# Patient Record
Sex: MALE | Race: WHITE | NOT HISPANIC OR LATINO | Employment: FULL TIME | URBAN - METROPOLITAN AREA
[De-identification: names, ages, dates, MRNs, and addresses within clinical notes are randomized per-mention and may not be internally consistent; named-entity substitution may affect disease eponyms.]

---

## 2018-08-05 ENCOUNTER — OFFICE VISIT (OUTPATIENT)
Dept: URGENT CARE | Facility: CLINIC | Age: 23
End: 2018-08-05
Payer: COMMERCIAL

## 2018-08-05 VITALS
HEIGHT: 68 IN | RESPIRATION RATE: 16 BRPM | WEIGHT: 219 LBS | SYSTOLIC BLOOD PRESSURE: 126 MMHG | TEMPERATURE: 97.3 F | OXYGEN SATURATION: 100 % | BODY MASS INDEX: 33.19 KG/M2 | DIASTOLIC BLOOD PRESSURE: 62 MMHG | HEART RATE: 72 BPM

## 2018-08-05 DIAGNOSIS — L23.7 ALLERGIC CONTACT DERMATITIS DUE TO PLANTS, EXCEPT FOOD: Primary | ICD-10-CM

## 2018-08-05 PROCEDURE — 99213 OFFICE O/P EST LOW 20 MIN: CPT | Performed by: FAMILY MEDICINE

## 2018-08-05 RX ORDER — PREDNISONE 20 MG/1
TABLET ORAL
Qty: 14 TABLET | Refills: 0 | Status: SHIPPED | OUTPATIENT
Start: 2018-08-05

## 2018-08-05 NOTE — PROGRESS NOTES
Assessment/Plan:    Problem List Items Addressed This Visit        Musculoskeletal and Integument    Allergic contact dermatitis due to plants, except food - Primary    Relevant Medications    predniSONE 20 mg tablet          Patient Instructions   COOL COMPRESSES  BENADRYL AS NEEDED  MEDICATION AS DIRECTED  RV PRN      Return in about 1 week (around 8/12/2018), or if symptoms worsen or fail to improve  Subjective:      Patient ID: Judith Ochoa is a 26 y o  male  Chief Complaint   Patient presents with    Rash     rash outbreak on legs and arms bilateral; onset 2 weeks       Rash   This is a new problem  The current episode started yesterday  The problem has been rapidly worsening since onset  The affected locations include the torso, left upper leg and right upper leg  The rash is characterized by redness and itchiness  He was exposed to plant contact  Pertinent negatives include no anorexia, congestion, cough, diarrhea, eye pain, facial edema, fatigue, fever, joint pain, nail changes, rhinorrhea, shortness of breath, sore throat or vomiting  Past treatments include nothing  His past medical history is significant for allergies  The following portions of the patient's history were reviewed and updated as appropriate: allergies, current medications, past family history, past medical history, past social history, past surgical history and problem list     Review of Systems   Constitutional: Negative for chills, fatigue and fever  HENT: Negative for congestion, rhinorrhea and sore throat  Eyes: Negative for pain and discharge  Respiratory: Negative for cough, chest tightness and shortness of breath  Cardiovascular: Negative for chest pain and palpitations  Gastrointestinal: Negative for abdominal pain, anorexia, diarrhea, nausea and vomiting  Musculoskeletal: Negative for arthralgias, gait problem and joint pain  Skin: Positive for rash  Negative for nail changes     Neurological: Negative for dizziness, weakness and headaches  Hematological: Negative for adenopathy  Psychiatric/Behavioral: The patient is not nervous/anxious  Current Outpatient Prescriptions   Medication Sig Dispense Refill    predniSONE 20 mg tablet 2 PO QD X 4 DAYS, THEN 1 PO QD X 4 DAYS, THEN 1/2 PO QD X 4 DAYS 14 tablet 0     No current facility-administered medications for this visit  Objective:    /62   Pulse 72   Temp (!) 97 3 °F (36 3 °C)   Resp 16   Ht 5' 8" (1 727 m)   Wt 99 3 kg (219 lb)   SpO2 100%   BMI 33 30 kg/m²        Physical Exam   Constitutional: He is oriented to person, place, and time  He appears well-developed and well-nourished  HENT:   Head: Normocephalic and atraumatic  Eyes: Conjunctivae and EOM are normal  Pupils are equal, round, and reactive to light  Right eye exhibits no discharge  Left eye exhibits no discharge  Neck: Neck supple  No JVD present  No thyromegaly present  Cardiovascular: Normal rate, regular rhythm and normal heart sounds  No murmur heard  Pulmonary/Chest: Effort normal and breath sounds normal  He has no wheezes  He has no rales  Abdominal: Soft  Bowel sounds are normal  He exhibits no mass  There is no hepatosplenomegaly  There is no tenderness  There is no rebound, no guarding and no CVA tenderness  Musculoskeletal: Normal range of motion  He exhibits no edema, tenderness or deformity  Lymphadenopathy:     He has no cervical adenopathy  He has no axillary adenopathy  Neurological: He is alert and oriented to person, place, and time  Skin: Skin is warm and dry  Rash noted  No erythema  RASH ON TORSO AND BOTH LOWER LEGS  ERYTHEMATOUS  PRURITIC  CONSISTENT WITH A CONTACT DERM   Psychiatric: He has a normal mood and affect   His behavior is normal  Judgment and thought content normal               Nani Berger MD

## 2019-06-11 ENCOUNTER — OFFICE VISIT (OUTPATIENT)
Dept: URGENT CARE | Facility: CLINIC | Age: 24
End: 2019-06-11
Payer: COMMERCIAL

## 2019-06-11 VITALS
HEART RATE: 81 BPM | WEIGHT: 206 LBS | DIASTOLIC BLOOD PRESSURE: 58 MMHG | TEMPERATURE: 99.9 F | BODY MASS INDEX: 31.32 KG/M2 | SYSTOLIC BLOOD PRESSURE: 110 MMHG | RESPIRATION RATE: 16 BRPM | OXYGEN SATURATION: 100 %

## 2019-06-11 DIAGNOSIS — L23.7 POISON IVY DERMATITIS: Primary | ICD-10-CM

## 2019-06-11 PROCEDURE — 99213 OFFICE O/P EST LOW 20 MIN: CPT | Performed by: PHYSICIAN ASSISTANT

## 2019-06-11 RX ORDER — METHYLPREDNISOLONE 4 MG/1
TABLET ORAL
Qty: 1 EACH | Refills: 0 | Status: SHIPPED | OUTPATIENT
Start: 2019-06-11

## 2022-04-26 ENCOUNTER — OCCUPATIONAL MEDICINE (OUTPATIENT)
Dept: URGENT CARE | Facility: CLINIC | Age: 27
End: 2022-04-26
Payer: COMMERCIAL

## 2022-04-26 ENCOUNTER — APPOINTMENT (OUTPATIENT)
Dept: RADIOLOGY | Facility: IMAGING CENTER | Age: 27
End: 2022-04-26
Attending: FAMILY MEDICINE
Payer: COMMERCIAL

## 2022-04-26 VITALS
RESPIRATION RATE: 16 BRPM | OXYGEN SATURATION: 99 % | HEIGHT: 69 IN | TEMPERATURE: 99.1 F | DIASTOLIC BLOOD PRESSURE: 72 MMHG | HEART RATE: 74 BPM | WEIGHT: 193 LBS | SYSTOLIC BLOOD PRESSURE: 122 MMHG | BODY MASS INDEX: 28.58 KG/M2

## 2022-04-26 DIAGNOSIS — S86.911A KNEE STRAIN, RIGHT, INITIAL ENCOUNTER: ICD-10-CM

## 2022-04-26 DIAGNOSIS — S89.91XA INJURY OF RIGHT KNEE, INITIAL ENCOUNTER: ICD-10-CM

## 2022-04-26 DIAGNOSIS — Z87.828 HISTORY OF MENISCAL TEAR: ICD-10-CM

## 2022-04-26 PROCEDURE — 99203 OFFICE O/P NEW LOW 30 MIN: CPT | Performed by: FAMILY MEDICINE

## 2022-04-26 PROCEDURE — 73564 X-RAY EXAM KNEE 4 OR MORE: CPT | Mod: TC,RT | Performed by: FAMILY MEDICINE

## 2022-04-26 NOTE — LETTER
[address] [place] [address]  Phone:  [phone] - Fax:  [phone]   [facility] Progress Report and Disability Certification  Date of Service: 2022   No Show:  No  Date / Time of Next Visit: 5/3/2022 occupational medication   Claim Information   Patient Name: Nick Brewer  Claim Number:     Employer: [employer]  Date of Injury: 2022     Insurer / TPA: [employer] Workers [employer]  ID / SSN:     Occupation: Maintenance  Diagnosis: Diagnoses of Injury of right knee, initial encounter, Knee strain, right, initial encounter, and History of meniscal tear were pertinent to this visit.    Medical Information   Related to Industrial Injury? Yes    Subjective Complaints:  DOI 2010  Right knee injury.  DELMER: Slipped slipped and fell on wet grass.  He is unsure if he struck his knee directly to the ground, twisted, or both.  Initially had sensation of giving way and has also had sensation of locking.  Past medical history meniscus tear.  Knee feels tight.  No abrasion or laceration.  No other aggravating or alleviating factors.   Objective Findings: Right knee: tender medial joint line, no deformity, full range of motion. Stable. No obvious effusion. Distal neuro/vascular intact.      Pre-Existing Condition(s):     Assessment:   Initial Visit    Status: Additional Care Required  Permanent Disability:No    Plan:   Comments:knee brace, ice, otc nsaid, rest    Diagnostics: X-ray  Comments:negative    Comments:       Disability Information   Status: Released to Restricted Duty    From:  2022  Through: 5/3/2022 Restrictions are: Temporary   Physical Restrictions   Sitting:    Standing:  < or = to 2 hrs/day Stooping:    Bending:      Squattin hrs/day Walking:  < or = to 1 hr/day Climbing:    Pushing:      Pulling:    Other:    Reaching Above Shoulder (L):   Reaching Above Shoulder (R):       Reaching Below Shoulder (L):     Reaching Below Shoulder (R):      Not to exceed Weight Limits   Carrying(hrs):   Weight Limit(lb): < or = to 10 pounds Lifting(hrs):   Weight  Limit(lb): < or = to 10 pounds   Comments:      Repetitive Actions   Hands: i.e. Fine Manipulations from Grasping:     Feet: i.e. Operating Foot Controls:     Driving / Operate Machinery:     Health Care Provider’s Original or Electronic Signature  Luis Aguirre M.D. Health Care Provider’s Original or Electronic Signature    Khurram Posada MD         Clinic Name / Location: [address] [address] Clinic Phone Number: Dept: [phone]   Appointment Time: 4:00 Pm Visit Start Time: 5:12 PM   Check-In Time:  4:35 Pm Visit Discharge Time:  6:13 PM   Original-Treating Physician or Chiropractor    Page 2-Insurer/TPA    Page 3-Employer    Page 4-Employee

## 2022-04-26 NOTE — LETTER
"EMPLOYEE’S CLAIM FOR COMPENSATION/ REPORT OF INITIAL TREATMENT  FORM C-4    EMPLOYEE’S CLAIM - PROVIDE ALL INFORMATION REQUESTED   First Name  Nick Last Name  Daniella Birthdate                    1995                Sex  male Claim Number (Insurer’s Use Only)    Home Address  [address] [place] [address] Age  26 y.o. 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  Zip  [address]    Date of Injury  4/26/2022               Hours Injury  10:30 AM Date Employer Notified  4/26/2022 Last Day of Work after Injury     or Occupational Disease  4/26/2022 Supervisor to Whom Injury     Reported  [place]   Address or Location of Accident (if applicable)  [place]   What were you doing at the time of accident? (if applicable)  Moving the green/walking around the green    How did this injury or occupational disease occur? (Be specific an answer in detail. Use additional sheet if necessary)  Sipped of freshly watered grass or clipping   If you believe that you have an occupational disease, when did you first have knowledge of the disability and it relationship to your employment?  N/A Witnesses to the Accident  None      Nature of Injury or Occupational Disease  Contusion  Part(s) of Body Injured or Affected  Knee (R), ,     I certify that the above is true and correct to the best of my knowledge and that I have provided this information in order to obtain the " benefits of [place]  I hereby authorize any physician, chiropractor, surgeon, practitioner, or other person, any hospital, including [facility], any medical service organization, any insurance company, or other institution or organization to release to each other, any medical or other information, including benefits paid or payable, pertinent to this injury or disease, except information relative to diagnosis, treatment and/or counseling for AIDS, psychological conditions, alcohol or controlled substances, for which I must give specific authorization.  A Photostat of this authorization shall be as valid as the original.     Date   Place Employee’s Original or  *Electronic Signature   THIS REPORT MUST BE COMPLETED AND MAILED WITHIN 3 WORKING DAYS OF TREATMENT   Place  [facility]   Date  4/26/2022 Diagnosis and Description of Injury or Occupational Disease  (S89.91XA) Injury of right knee, initial encounter  (S86.911A) Knee strain, right, initial encounter  (Z87.828) History of meniscal tear Is there evidence the injured employee was under the influence of alcohol and/or another controlled substance at the time of accident?  ? No ? Yes (if yes, please explain)    Hour  5:12 PM   Diagnoses of Injury of right knee, initial encounter, Knee strain, right, initial encounter, and History of meniscal tear were pertinent to this visit. No   Treatment  knee brace, ice, otc nsaid, rest  Have you advised the patient to remain off work five days or     more?    X-Ray Findings  Negative   ? Yes Indicate dates:   From   To      From information given by the employee, together with medical evidence, can        you directly connect this injury or occupational disease as job incurred?  Yes ? No If no, is the injured employee capable of:  ? full duty  No ?  "modified duty  Yes   Is additional medical care by a physician indicated?  Yes If Modified Duty, Specify any Limitations / Restrictions  No squatting  Standing < 2 hours  Walking < 1 hour  Lifting, carrying <10# weight limit   Do you know of any previous injury or disease contributing to this condition or occupational disease?  ? Yes ? No (Explain if yes)                          No   Date  4/26/2022 Print Health Care Provider's   Luis Aguirre M.D. I certify the employer’s copy of  this form was mailed on:   Address  [address] [place] [address] Insurer’s Use Only     [facility] Zip  [address]    Provider’s Tax ID Number  [id number] Telephone  Dept: [phone]             Health Care Provider’s Original or Electronic Signature  e-LUIS Raymond M.D. Degree (MD,DO, DC,PA-C,APRN)   MD      * Complete and attach Release of Information (Form C-4A) when injured employee signs C-4 Form electronically  ORIGINAL - TREATING HEALTHCARE PROVIDER PAGE 2 - INSURER/TPA PAGE 3 - EMPLOYER PAGE 4 - EMPLOYEE             Form C-4 (rev.08/21)           BRIEF DESCRIPTION OF RIGHTS AND BENEFITS  (Pursuant to [place] 616C.050)    Notice of Injury or Occupational Disease (Incident Report Form C-1): If an injury or occupational disease (OD) arises out of and in the course of employment, you must provide written notice to your employer as soon as practicable, but no later than 7 days after the accident or OD. Your employer shall maintain a sufficient supply of the required forms.    Claim for Compensation (Form C-4): If medical treatment is sought, the form C-4 is available at the place of initial treatment. A completed \"Claim for Compensation\" (Form C-4) must be filed within 90 days after an accident or OD. The treating physician or chiropractor must, within 3 working days after treatment, complete and mail to the employer, the employer's insurer and third-party , the Claim for Compensation.    Medical Treatment: If you " require medical treatment for your on-the-job injury or OD, you may be required to select a physician or chiropractor from a list provided by your workers’ compensation insurer, if it has contracted with an Organization for Managed Care (MCO) or Preferred Provider Organization (PPO) or providers of health care. If your employer has not entered into a contract with an MCO or PPO, you may select a physician or chiropractor from the Panel of Physicians and Chiropractors. Any medical costs related to your industrial injury or OD will be paid by your insurer.    Temporary Total Disability (TTD): If your doctor has certified that you are unable to work for a period of at least 5 consecutive days, or 5 cumulative days in a 20-day period, or places restrictions on you that your employer does not accommodate, you may be entitled to TTD compensation.    Temporary Partial Disability (TPD): If the wage you receive upon reemployment is less than the compensation for TTD to which you are entitled, the insurer may be required to pay you TPD compensation to make up the difference. TPD can only be paid for a maximum of 24 months.    Permanent Partial Disability (PPD): When your medical condition is stable and there is an indication of a PPD as a result of your injury or OD, within 30 days, your insurer must arrange for an evaluation by a rating physician or chiropractor to determine the degree of your PPD. The amount of your PPD award depends on the date of injury, the results of the PPD evaluation, your age and wage.    Permanent Total Disability (PTD): If you are medically certified by a treating physician or chiropractor as permanently and totally disabled and have been granted a PTD status by your insurer, you are entitled to receive monthly benefits not to exceed 66 2/3% of your average monthly wage. The amount of your PTD payments is subject to reduction if you previously received a lump-sum PPD award.    Vocational  Rehabilitation Services: You may be eligible for vocational rehabilitation services if you are unable to return to the job due to a permanent physical impairment or permanent restrictions as a result of your injury or occupational disease.    Transportation and Per Kenji Reimbursement: You may be eligible for travel expenses and per kenji associated with medical treatment.    Reopening: You may be able to reopen your claim if your condition worsens after claim closure.     Appeal Process: If you disagree with a written determination issued by the insurer or the insurer does not respond to your request, you may appeal to the Department of Administration, , by following the instructions contained in your determination letter. You must appeal the determination within 70 days from the date of the determination letter at [address], [address], [place], [place] [address], or [address], [address], [place], [place] [address]. If you disagree with the  decision, you may appeal to the Department of Administration, . You must file your appeal within 30 days from the date of the  decision letter at [address], [address], [place], [place] [address], or [address], [address], [place], [place] [address]. If you disagree with a decision of an , you may file a petition for judicial review with the District Court. You must do so within 30 days of the Appeal Officer’s decision. You may be represented by an  at your own expense or you may contact the [facility] for possible representation.    [place]  for Injured Workers (NAIW): If you disagree with a  decision, you may request that NAIW represent you without charge at an  Hearing. For information regarding denial of benefits, you may contact the [facility] at: [address], [address], [place] [address], [phone], or [address], [address], [place] [address], [phone]    To File a Complaint with the Division: If you wish to file a complaint with the  of the Division of Industrial Relations (DIR),  please contact the Workers’ Compensation Section, [address], [address], [place], [place] [address], telephone ([phone]) [phone], or [address], [address], [place], [place] [address], telephone [phone].    For assistance with Workers’ Compensation Issues: You may contact the [facility] Office for Consumer Health Assistance, [address], [address], [place], [place] [address], Toll Free 1-615.817.6152, Web site: http://[facility].[place].gov/Programs/CRISTAL E-mail: cristal@[email or web address].[email or web address].[email or web address]              __________________________________________________________________                                    _________________            Employee Name / Signature                                                                                                                            Date                                                                                                                                                                                                                              D-2 (rev. 10/20)

## 2022-05-03 ENCOUNTER — OCCUPATIONAL MEDICINE (OUTPATIENT)
Dept: OCCUPATIONAL MEDICINE | Facility: CLINIC | Age: 27
End: 2022-05-03
Payer: COMMERCIAL

## 2022-05-03 VITALS
DIASTOLIC BLOOD PRESSURE: 72 MMHG | SYSTOLIC BLOOD PRESSURE: 120 MMHG | WEIGHT: 200 LBS | HEIGHT: 69 IN | RESPIRATION RATE: 18 BRPM | BODY MASS INDEX: 29.62 KG/M2

## 2022-05-03 DIAGNOSIS — S86.911D KNEE STRAIN, RIGHT, SUBSEQUENT ENCOUNTER: ICD-10-CM

## 2022-05-03 PROCEDURE — 99203 OFFICE O/P NEW LOW 30 MIN: CPT | Performed by: PREVENTIVE MEDICINE

## 2022-05-03 NOTE — LETTER
[address],   [address] [place] [address]  Phone:  [phone] - Fax:  [phone]   Occupational Health [facility] Progress Report and Disability Certification  Date of Service: 5/3/2022   No Show:  No  Date / Time of Next Visit: 5/24/2022 @ 9:45am   Claim Information   Patient Name: Nick Brewer  Claim Number:     Employer: [employer]  Date of Injury: 4/26/2022     Insurer / TPA: [employer]  ID / SSN:     Occupation: Maintenance  Diagnosis: The encounter diagnosis was Knee strain, right, subsequent encounter.    Medical Information   Related to Industrial Injury? 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  DELMER: Slipped slipped and fell on wet grass.  He is unsure if he struck his knee directly to the ground, twisted, or both.  He seen urgent care x1, x-rays of the right knee were negative for acute findings.  Patient states overall symptoms are the same.  He continues to have pain over the anterior medial knee.  He states he has a lot of clicking, popping and instability in the knee.  Finds it difficult to walk for more than 15 minutes due to the symptoms.  He wears a knee brace to help.  Patient does have a remote history of meniscal injury status post arthroscopy 8 years ago.  Currently not working due to work restrictions.   Objective Findings: Right knee: No gross deformity.  Tenderness over the patella tendon, medial joint line and posterior knee.  Possible mild laxity with anterior drawer test.  No laxity with posterior drawer test.  No laxity varus or valgus stress but does elicit pain.  Elpidio's positive.  Able to squat only about one quarter of the way down.   Pre-Existing Condition(s):     Assessment:   Condition Same    Status: Additional Care Required  Permanent Disability:No    Plan:      Diagnostics:      Comments:  Given symptoms and exam findings  will refer for MRI right knee  OTC ibuprofen/Tylenol as needed  Gradually increase walking as tolerated  Restricted duty  Follow-up 2-3 weeks sooner if MRI performed sooner    Disability Information   Status: Released to Restricted Duty    From:  5/3/2022  Through: 2022 Restrictions are: Temporary   Physical Restrictions   Sitting:    Standing:  < or = to 2 hrs/day Stooping:    Bending:      Squattin hrs/day Walking:  < or = to 1 hr/day Climbin hrs/day Pushing:      Pulling:    Other:    Reaching Above Shoulder (L):   Reaching Above Shoulder (R):       Reaching Below Shoulder (L):    Reaching Below Shoulder (R):      Not to exceed Weight Limits   Carrying(hrs):   Weight Limit(lb): < or = to 10 pounds Lifting(hrs):   Weight  Limit(lb): < or = to 10 pounds   Comments:      Repetitive Actions   Hands: i.e. Fine Manipulations from Grasping:     Feet: i.e. Operating Foot Controls:     Driving / Operate Machinery:     Health Care Provider’s Original or Electronic Signature  Tonio Rendon D.O. Health Care Provider’s Original or Electronic Signature    Khurram Posada MD         Clinic Name / Location: [address],   [address] [place] [address] Clinic Phone Number: Dept: [phone]   Appointment Time: 10:00 Am Visit Start Time: 10:10 AM   Check-In Time:  10:04 Am Visit Discharge Time:  10:54am   Original-Treating Physician or Chiropractor    Page 2-Insurer/TPA    Page 3-Employer    Page 4-Employee

## 2022-05-03 NOTE — PROGRESS NOTES
"Subjective     Nick Brewer is a 26 y.o. male who presents with Knee Pain ((R) fell on wet grass at work, leg gave out when trying to stand again, having some soreness/)      DOI 4/26/2010  Right knee injury.  DELMER: Slipped slipped and fell on wet grass.  He is unsure if he struck his knee directly to the ground, twisted, or both.  Initially had sensation of giving way and has also had sensation of locking.  Past medical history meniscus tear.  Knee feels tight.  No abrasion or laceration.  No other aggravating or alleviating factors.     HPI    ROS           Objective     /72   Pulse 74   Temp 37.3 °C (99.1 °F) (Temporal)   Resp 16   Ht 1.753 m (5' 9\")   Wt 87.5 kg (193 lb)   SpO2 99%   BMI 28.50 kg/m²      Physical Exam    Right knee: tender medial joint line, no deformity, full range of motion. Stable. No obvious effusion. Distal neuro/vascular intact.                      Assessment & Plan   Xray: no fracture or dislocation per radiology       1. Injury of right knee, initial encounter    - DX-KNEE COMPLETE 4+ RIGHT; Future  - Referral to Occupational Medicine    2. Knee strain, right, initial encounter    - Referral to Occupational Medicine    3. History of meniscal tear    - Referral to Occupational Medicine       Differential diagnosis, natural history, supportive care, and indications for immediate follow-up discussed at length.     Light duty on D 39.  Given his history of meniscus injury and surgery will transfer care to occupational medicine for follow-up.   Ice and NSAID as needed.        "

## 2022-05-03 NOTE — PROGRESS NOTES
"Subjective:     Nick Brewer is a 26 y.o. 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  DELMER: Slipped slipped and fell on wet grass.  He is unsure if he struck his knee directly to the ground, twisted, or both.  He seen urgent care x1, x-rays of the right knee were negative for acute findings.  Patient states overall symptoms are the same.  He continues to have pain over the anterior medial knee.  He states he has a lot of clicking, popping and instability in the knee.  Finds it difficult to walk for more than 15 minutes due to the symptoms.  He wears a knee brace to help.  Patient does have a remote history of meniscal injury status post arthroscopy 8 years ago.  Currently not working due to work restrictions.    ROS: All systems were reviewed on intake form, form was reviewed and signed. See scanned documents in media. Pertinent positives and negatives included in HPI.    PMH: No pertinent past medical history to this problem  MEDS: Medications were reviewed in Epic  ALLERGIES:   Allergies   Allergen Reactions   • Penicillins Hives     SOCHX: Works as a  at [employer]  FH: No pertinent family history to this problem       Objective:     /72   Resp 18   Ht 1.753 m (5' 9\")   Wt 90.7 kg (200 lb)   BMI 29.53 kg/m²     Constitutional: Patient is in no acute distress. Appears well-developed and well-nourished.   HENT: Normocephalic and atraumatic. EOM are normal. No scleral icterus.   Cardiovascular: Normal rate.    Pulmonary/Chest: Effort normal. No respiratory distress.   Neurological: Patient is alert and oriented to person, place, and time.   Skin: Skin is warm and dry.   Psychiatric: Normal mood and affect. Behavior is normal.     Right knee: No gross deformity.  Tenderness over the patella tendon, medial joint line and posterior knee.  Possible mild laxity " with anterior drawer test.  No laxity with posterior drawer test.  No laxity varus or valgus stress but does elicit pain.  Elpidio's positive.  Able to squat only about one quarter of the way down.    Assessment/Plan:       1. Knee strain, right, subsequent encounter  - Referral to Radiology  - MR-KNEE-W/O RIGHT; Future    Released to Restricted Duty FROM 5/3/2022 TO 5/24/2022     Given symptoms and exam findings will refer for MRI right knee  OTC ibuprofen/Tylenol as needed  Gradually increase walking as tolerated  Restricted duty  Follow-up 2-3 weeks sooner if MRI performed sooner    Differential diagnosis, natural history, supportive care, and indications for immediate follow-up discussed.    Approximately 35 minutes were spent in reviewing notes, preparing for visit, obtaining history, exam and evaluation, patient counseling/education and post visit documentation/orders.

## 2022-05-24 ENCOUNTER — OCCUPATIONAL MEDICINE (OUTPATIENT)
Dept: OCCUPATIONAL MEDICINE | Facility: CLINIC | Age: 27
End: 2022-05-24
Payer: COMMERCIAL

## 2022-05-24 VITALS
BODY MASS INDEX: 29.62 KG/M2 | DIASTOLIC BLOOD PRESSURE: 78 MMHG | HEART RATE: 76 BPM | HEIGHT: 69 IN | SYSTOLIC BLOOD PRESSURE: 124 MMHG | RESPIRATION RATE: 16 BRPM | WEIGHT: 200 LBS

## 2022-05-24 DIAGNOSIS — S86.911D KNEE STRAIN, RIGHT, SUBSEQUENT ENCOUNTER: ICD-10-CM

## 2022-05-24 PROCEDURE — 99213 OFFICE O/P EST LOW 20 MIN: CPT | Performed by: PREVENTIVE MEDICINE

## 2022-05-24 NOTE — PROGRESS NOTES
"Subjective:     Nick Brewer is a 26 y.o. 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  DELMER: Slipped slipped and fell on wet grass.  He is unsure if he struck his knee directly to the ground, twisted, or both.  Patient states overall symptoms about the same.  Continues to get pain on the medial side as well as in the posterior knee.  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  He is also pushing and pulling heavy carts of laundry.  He states these things did seem to worsen his symptoms.  Patient has MRI scheduled for June 14.    ROS         Objective:     /78   Pulse 76   Resp 16   Ht 1.753 m (5' 9\")   Wt 90.7 kg (200 lb)   BMI 29.53 kg/m²     Constitutional: Patient is in no acute distress. Appears well-developed and well-nourished.   Cardiovascular: Normal rate.    Pulmonary/Chest: Effort normal. No respiratory distress.   Neurological: Patient is alert and oriented to person, place, and time.   Skin: Skin is warm and dry.   Psychiatric: Normal mood and affect. Behavior is normal.     Right knee: No gross deformity.  Tenderness over the patella tendon, medial joint line and posterior knee.  Possible mild laxity with anterior drawer test.  Elpidio's positive.      Assessment/Plan:       1. Knee strain, right, subsequent encounter  - Referral to Physical Therapy    Released to Restricted Duty FROM 5/24/2022 TO 6/17/2022  If unable to accommodate these restrictions patient to be taken off work    Keep appointment for MRI right knee  Referral to physical therapy  OTC ibuprofen/Tylenol as needed  Gradually increase walking as tolerated  Restricted duty  Follow-up 2-3 weeks sooner if MRI performed sooner    Differential diagnosis, natural history, supportive care, and indications for immediate follow-up " discussed.    Approximately 25 minutes was spent in preparing for visit, obtaining history, exam and evaluation, patient counseling/education and post visit documentation/orders.

## 2022-05-24 NOTE — LETTER
[address],   [address] [place] [address]  Phone:  [phone] - Fax:  [phone]   [facility] Progress Report and Disability Certification  Date of Service: 5/24/2022   No Show:  No  Date / Time of Next Visit: 6/17/2022 @ +9:45am   Claim Information   Patient Name: Nick Brewer  Claim Number:     Employer: [employer]  Date of Injury: 4/26/2022     Insurer / TPA: [employer]  ID / SSN:     Occupation: Maintenance  Diagnosis: The encounter diagnosis was Knee strain, right, subsequent encounter.    Medical Information   Related to Industrial Injury? 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  DELMER: Slipped slipped and fell on wet grass.  He is unsure if he struck his knee directly to the ground, twisted, or both.  Patient states overall symptoms about the same.  Continues to get pain on the medial side as well as in the posterior knee.  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  He is also pushing and pulling heavy carts of laundry.  He states these things did seem to worsen his symptoms.  Patient has MRI scheduled for June 14.   Objective Findings: Right knee: No gross deformity.  Tenderness over the patella tendon, medial joint line and posterior knee.  Possible mild laxity with anterior drawer test.  Elpidio's positive.     Pre-Existing Condition(s):     Assessment:   Condition Same    Status: Additional Care Required  Permanent Disability:No    Plan:      Diagnostics:      Comments:  Keep appointment for MRI right knee  Referral to physical therapy  OTC ibuprofen/Tylenol as needed  Gradually increase walking as tolerated  Restricted duty  Follow-up 2-3 weeks sooner if MRI performed sooner    Disability Information   Status: Released to Restricted Duty     From:  2022  Through: 2022 Restrictions are: Temporary   Physical Restrictions   Sitting:    Standing:  < or = to 2 hrs/day Stooping:    Bending:      Squattin hrs/day Walking:  < or = to 1 hr/day Climbin hrs/day Pushing:  < or = to 1 hr/day   Pulling:  < or = to 1 hr/day Other:    Reaching Above Shoulder (L):   Reaching Above Shoulder (R):       Reaching Below Shoulder (L):    Reaching Below Shoulder (R):      Not to exceed Weight Limits   Carrying(hrs):   Weight Limit(lb): < or = to 10 pounds Lifting(hrs):   Weight  Limit(lb): < or = to 10 pounds   Comments: If unable to accommodate these restrictions patient to be taken off work    Repetitive Actions   Hands: i.e. Fine Manipulations from Grasping:     Feet: i.e. Operating Foot Controls:     Driving / Operate Machinery:     Health Care Provider’s Original or Electronic Signature  Tonio Rendon D.O. Health Care Provider’s Original or Electronic Signature    Khurram Posada MD         Clinic Name / Location: [address],   [address] [address] Clinic Phone Number: Dept: [phone]   Appointment Time: 9:45 Am Visit Start Time: 9:44 AM   Check-In Time:  9:36 Am Visit Discharge Time:  10:26am   Original-Treating Physician or Chiropractor    Page 2-Insurer/TPA    Page 3-Employer    Page 4-Employee

## 2022-06-10 ENCOUNTER — OCCUPATIONAL MEDICINE (OUTPATIENT)
Dept: OCCUPATIONAL MEDICINE | Facility: CLINIC | Age: 27
End: 2022-06-10
Payer: COMMERCIAL

## 2022-06-10 VITALS
TEMPERATURE: 36.5 F | SYSTOLIC BLOOD PRESSURE: 120 MMHG | HEIGHT: 69 IN | OXYGEN SATURATION: 97 % | WEIGHT: 195 LBS | HEART RATE: 88 BPM | DIASTOLIC BLOOD PRESSURE: 82 MMHG | BODY MASS INDEX: 28.88 KG/M2

## 2022-06-10 DIAGNOSIS — S86.911D KNEE STRAIN, RIGHT, SUBSEQUENT ENCOUNTER: ICD-10-CM

## 2022-06-10 PROCEDURE — 99213 OFFICE O/P EST LOW 20 MIN: CPT | Performed by: PREVENTIVE MEDICINE

## 2022-06-10 NOTE — LETTER
[address],   [address] [place] [address]  Phone:  [phone] - Fax:  [phone]   Occupational Health [facility] Progress Report and Disability Certification  Date of Service: 6/10/2022   No Show:  No  Date / Time of Next Visit: 2022 @ 4pm   Claim Information   Patient Name: Nick Brewer  Claim Number:     Employer: [employer]  Date of Injury: 2022     Insurer / TPA: [employer]  ID / SSN:     Occupation: Maintenance  Diagnosis: The encounter diagnosis was Knee strain, right, subsequent encounter.    Medical Information   Related to Industrial Injury? 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  DELMER: Slipped slipped and fell on wet grass.  Patient states overall symptoms have been gradually worsening.  He states with physical therapy has had some worsening of symptoms.  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  He has MRI scheduled the next few days.   Objective Findings: Right knee: No gross deformity.  Area of pain over the anterior, medial and posterior knee.  Antalgic gait.   Pre-Existing Condition(s):     Assessment:   Condition Same    Status: Additional Care Required  Permanent Disability:No    Plan:      Diagnostics:      Comments:  Keep appointment for MRI right knee  Continue physical therapy  OTC ibuprofen/Tylenol as needed  Gradually increase walking as tolerated  Restricted duty  Follow-up next week for MRI results    Disability Information   Status: Released to Restricted Duty    From:  6/10/2022  Through: 2022 Restrictions are: Temporary   Physical Restrictions   Sitting:    Standing:  < or = to 2 hrs/day Stooping:    Bending:      Squattin hrs/day Walking:  < or = to 1 hr/day Climbin hrs/day Pushing:      Pulling:     Other:    Reaching Above Shoulder (L):   Reaching Above Shoulder (R):       Reaching Below Shoulder (L):    Reaching Below Shoulder (R):      Not to exceed Weight Limits   Carrying(hrs):   Weight Limit(lb): < or = to 10 pounds Lifting(hrs):   Weight  Limit(lb): < or = to 10 pounds   Comments:  If unable to accommodate these restrictions patient to be taken off work.  Restrict driving to 30-minutes.    Repetitive Actions   Hands: i.e. Fine Manipulations from Grasping:     Feet: i.e. Operating Foot Controls:     Driving / Operate Machinery:     Health Care Provider’s Original or Electronic Signature  Tonio Rendon D.O. Health Care Provider’s Original or Electronic Signature    Khurram Posada MD         Clinic Name / Location: [address] [place] [address] Clinic Phone Number: Dept: [phone]   Appointment Time: 10:45 Am Visit Start Time: 11:01 AM   Check-In Time:  10:47 Am Visit Discharge Time:  11:25am   Original-Treating Physician or Chiropractor    Page 2-Insurer/TPA    Page 3-Employer    Page 4-Employee

## 2022-06-10 NOTE — PROGRESS NOTES
"Subjective:     Nick Brewer is a 26 y.o. 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  DELMER: Slipped slipped and fell on wet grass.  Patient states overall symptoms have been gradually worsening.  He states with physical therapy has had some worsening of symptoms.  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  He has MRI scheduled the next few days.    ROS         Objective:     /82   Pulse 88   Temp (!) 2.5 °C (36.5 °F)   Ht 1.753 m (5' 9\")   Wt 88.5 kg (195 lb)   SpO2 97%   BMI 28.80 kg/m²     Constitutional: Patient is in no acute distress. Appears well-developed and well-nourished.   Cardiovascular: Normal rate.    Pulmonary/Chest: Effort normal. No respiratory distress.   Neurological: Patient is alert and oriented to person, place, and time.   Skin: Skin is warm and dry.   Psychiatric: Normal mood and affect. Behavior is normal.     Right knee: No gross deformity.  Area of pain over the anterior, medial and posterior knee.  Antalgic gait.    Assessment/Plan:       1. Knee strain, right, subsequent encounter    Released to Restricted Duty FROM 6/10/2022 TO 6/17/2022   If unable to accommodate these restrictions patient to be taken off work.  Restrict driving to 30-minutes.    Keep appointment for MRI right knee  Continue physical therapy  OTC ibuprofen/Tylenol as needed  Gradually increase walking as tolerated  Restricted duty  Follow-up next week for MRI results    Differential diagnosis, natural history, supportive care, and indications for immediate follow-up discussed.    Approximately 25 minutes was spent in preparing for visit, obtaining history, exam and evaluation, patient counseling/education and post visit documentation/orders.  "

## 2022-06-11 ENCOUNTER — APPOINTMENT (OUTPATIENT)
Dept: RADIOLOGY | Facility: MEDICAL CENTER | Age: 27
End: 2022-06-11
Attending: PREVENTIVE MEDICINE
Payer: COMMERCIAL

## 2022-06-17 ENCOUNTER — OCCUPATIONAL MEDICINE (OUTPATIENT)
Dept: OCCUPATIONAL MEDICINE | Facility: CLINIC | Age: 27
End: 2022-06-17
Payer: COMMERCIAL

## 2022-06-17 VITALS
DIASTOLIC BLOOD PRESSURE: 64 MMHG | WEIGHT: 198.4 LBS | HEIGHT: 69 IN | BODY MASS INDEX: 29.38 KG/M2 | TEMPERATURE: 99.4 F | SYSTOLIC BLOOD PRESSURE: 114 MMHG | HEART RATE: 62 BPM | RESPIRATION RATE: 18 BRPM | OXYGEN SATURATION: 97 %

## 2022-06-17 DIAGNOSIS — S86.911D KNEE STRAIN, RIGHT, SUBSEQUENT ENCOUNTER: ICD-10-CM

## 2022-06-17 PROCEDURE — 99213 OFFICE O/P EST LOW 20 MIN: CPT | Performed by: PREVENTIVE MEDICINE

## 2022-06-17 NOTE — PROGRESS NOTES
"Subjective:     Nick Brewer is a 27 y.o. male who presents for Injury (DOI: 4/26/2022 Rt. Knee injury; doing a bit better reg pain. 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  DELMER: Slipped slipped and fell on wet grass.  Patient states of being off this past week has had good improvement in symptoms.  Had MRI performed over this past weekend.  Here for results    ROS    SOCHX: Works as a  at [employer]  FH: No pertinent family history to this problem.       Objective:     /64 (BP Location: Right arm, Patient Position: Sitting, BP Cuff Size: Adult)   Pulse 62   Temp 37.4 °C (99.4 °F) (Temporal)   Resp 18   Ht 1.753 m (5' 9\")   Wt 90 kg (198 lb 6.4 oz)   SpO2 97%   BMI 29.30 kg/m²     Constitutional: Patient is in no acute distress. Appears well-developed and well-nourished.   Cardiovascular: Normal rate.    Pulmonary/Chest: Effort normal. No respiratory distress.   Neurological: Patient is alert and oriented to person, place, and time.   Skin: Skin is warm and dry.   Psychiatric: Normal mood and affect. Behavior is normal.     Right knee: No gross deformity.  Area of pain over the anterior, medial and posterior knee.  Antalgic gait.    MRI right knee: \"Patella bethanie otherwise essentially unremarkable exam.\"    Assessment/Plan:       1. Knee strain, right, subsequent encounter  - Referral to Physical Therapy    Released to Restricted Duty FROM 6/17/2022 TO 7/8/2022  Limit driving to 2 hrs per session    Continue physical therapy, placed referral for 6 more visits  OTC/Tylenol as needed  Wean off knee brace as tolerated  Restricted duty   follow-up 3 weeks    Differential diagnosis, natural history, supportive care, and indications for immediate follow-up discussed.    Approximately 25 minutes was spent in preparing for visit, obtaining history, exam and evaluation, patient " counseling/education and post visit documentation/orders.

## 2022-06-17 NOTE — LETTER
"[address],   [address] [address]  Phone:  [phone] - Fax:  [phone]   Occupational Health [facility] Progress Report and Disability Certification  Date of Service: 6/17/2022   No Show:  No  Date / Time of Next Visit: 7/8/2022   Claim Information   Patient Name: Nick Brewer  Claim Number:     Employer: [employer]  Date of Injury: 4/26/2022     Insurer / TPA: [employer]  ID / SSN:     Occupation: Maintenance  Diagnosis: The encounter diagnosis was Knee strain, right, subsequent encounter.    Medical Information   Related to Industrial Injury? 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  DELMER: Slipped slipped and fell on wet grass.  Patient states of being off this past week has had good improvement in symptoms.  Had MRI performed over this past weekend.  Here for results   Objective Findings: Right knee: No gross deformity.  Area of pain over the anterior, medial and posterior knee.  Antalgic gait.    MRI right knee: \"Patella bethanie otherwise essentially unremarkable exam.\"   Pre-Existing Condition(s):     Assessment:   Condition Same    Status: Additional Care Required  Permanent Disability:No    Plan:      Diagnostics:      Comments:  Continue physical therapy, placed referral for 6 more visits  OTC/Tylenol as needed  Wean off knee brace as tolerated  Restricted duty   follow-up 3 weeks    Disability Information   Status: Released to Restricted Duty    From:  6/17/2022  Through: 7/8/2022 Restrictions are: Temporary   Physical Restrictions   Sitting:    Standing:  < or = to 4 hrs/day Stooping:    Bending:      Squatting:  < or = to 1 hr/day Walking:  < or = to 2 hrs/day Climbing:  < or = to 1 hr/day Pushing:      Pulling:    Other:    Reaching Above Shoulder (L):   Reaching Above Shoulder (R):       Reaching Below Shoulder (L):    Reaching Below Shoulder " (R):      Not to exceed Weight Limits   Carrying(hrs):   Weight Limit(lb): < or = to 10 pounds Lifting(hrs):   Weight  Limit(lb): < or = to 10 pounds   Comments: Limit driving to 2 hrs per session    Repetitive Actions   Hands: i.e. Fine Manipulations from Grasping:     Feet: i.e. Operating Foot Controls:     Driving / Operate Machinery:     Health Care Provider’s Original or Electronic Signature  Tonio Rendon D.O. Health Care Provider’s Original or Electronic Signature    Khurram Posada MD         Clinic Name / Location: [address],   [address]  [place] [address] Clinic Phone Number: Dept: [phone]   Appointment Time: 4:00 Pm Visit Start Time: 4:01 PM   Check-In Time:  3:59 Pm Visit Discharge Time:  4:21 PM   Original-Treating Physician or Chiropractor    Page 2-Insurer/TPA    Page 3-Employer    Page 4-Employee

## 2022-07-08 ENCOUNTER — OCCUPATIONAL MEDICINE (OUTPATIENT)
Dept: OCCUPATIONAL MEDICINE | Facility: CLINIC | Age: 27
End: 2022-07-08
Payer: COMMERCIAL

## 2022-07-08 VITALS
OXYGEN SATURATION: 99 % | RESPIRATION RATE: 16 BRPM | TEMPERATURE: 98.7 F | BODY MASS INDEX: 28.91 KG/M2 | WEIGHT: 195.2 LBS | DIASTOLIC BLOOD PRESSURE: 66 MMHG | HEIGHT: 69 IN | SYSTOLIC BLOOD PRESSURE: 124 MMHG | HEART RATE: 67 BPM

## 2022-07-08 DIAGNOSIS — S86.911D KNEE STRAIN, RIGHT, SUBSEQUENT ENCOUNTER: ICD-10-CM

## 2022-07-08 PROCEDURE — 99213 OFFICE O/P EST LOW 20 MIN: CPT | Performed by: PREVENTIVE MEDICINE

## 2022-07-08 NOTE — LETTER
"[address],   [address] [place] [address]  Phone:  [phone] - Fax:  [phone]   Occupational Health [facility] Progress Report and Disability Certification  Date of Service: 7/8/2022   No Show:  No  Date / Time of Next Visit:  PAULINA ORtho   Claim Information   Patient Name: Nick Brewer  Claim Number:     Employer: [employer]  Date of Injury: 4/26/2022     Insurer / TPA: [employer]  ID / SSN:     Occupation: Maintenance  Diagnosis: The encounter diagnosis was Knee strain, right, subsequent encounter.    Medical Information   Related to Industrial Injury? 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  DELMER: Slipped slipped and fell on wet grass.  Patient states that overall symptoms are about the same.  He states the tightness and pain he is having in his hamstring and surrounding musculature has improved with physical therapy but continues to have significant grinding, clicking and popping of the knee associated with discomfort.  Patient states he was discussing with the nurse  was interested in possible referral to orthopedic.   Objective Findings: Right knee: No gross deformity.  Area of pain over the anterior and medial knee.  Able to squat down about [facility].  Antalgic gait.     MRI right knee: \"Patella bethanie otherwise essentially unremarkable exam.\"   Pre-Existing Condition(s):     Assessment:   Condition Same    Status: Discharged / Care Transfer  Permanent Disability:No    Plan:      Diagnostics:      Comments:  Placed referral for orthopedics  Finish any remaining physical therapy visits  OTC ibuprofen as needed  Gradually increase walking and squatting as tolerated  Restricted duty  Follow-up as needed      Disability Information   Status: Released to Restricted Duty    From:  7/8/2022  Through:   Restrictions are: Temporary   Physical Restrictions "   Sitting:    Standing:  < or = to 4 hrs/day Stooping:    Bending:      Squatting:  < or = to 1 hr/day Walking:  < or = to 2 hrs/day Climbing:    Pushing:      Pulling:    Other:    Reaching Above Shoulder (L):   Reaching Above Shoulder (R):       Reaching Below Shoulder (L):    Reaching Below Shoulder (R):      Not to exceed Weight Limits   Carrying(hrs):   Weight Limit(lb):   Lifting(hrs):   Weight  Limit(lb):     Comments: Limit driving to 2 hrs per session. Until cleared by orthopedics    Repetitive Actions   Hands: i.e. Fine Manipulations from Grasping:     Feet: i.e. Operating Foot Controls:     Driving / Operate Machinery: < or = to 2 hrs/day   Health Care Provider’s Original or Electronic Signature  Tonio Rendon D.O. Health Care Provider’s Original or Electronic Signature    Khurram Posada MD         Clinic Name / Location: [address],   [address] [place] [address] Clinic Phone Number: Dept: [phone]   Appointment Time: 8:30 Am Visit Start Time: 8:33 AM   Check-In Time:  8:19 Am Visit Discharge Time:  0853am     Original-Treating Physician or Chiropractor    Page 2-Insurer/TPA    Page 3-Employer    Page 4-Employee

## 2022-07-08 NOTE — PROGRESS NOTES
"Subjective:     Nick Brewer is a 27 y.o. 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  DELMER: Slipped slipped and fell on wet grass.  Patient states that overall symptoms are about the same.  He states the tightness and pain he is having in his hamstring and surrounding musculature has improved with physical therapy but continues to have significant grinding, clicking and popping of the knee associated with discomfort.  Patient states he was discussing with the nurse  was interested in possible referral to orthopedic.    ROS    SOCHX: Works as a  at [employer]  FH: No pertinent family history to this problem.       Objective:     /66 (BP Location: Right arm, Patient Position: Sitting, BP Cuff Size: Adult)   Pulse 67   Temp 37.1 °C (98.7 °F) (Temporal)   Resp 16   Ht 1.753 m (5' 9\")   Wt 88.5 kg (195 lb 3.2 oz)   SpO2 99%   BMI 28.83 kg/m²     Constitutional: Patient is in no acute distress. Appears well-developed and well-nourished.   Cardiovascular: Normal rate.    Pulmonary/Chest: Effort normal. No respiratory distress.   Neurological: Patient is alert and oriented to person, place, and time.   Skin: Skin is warm and dry.   Psychiatric: Normal mood and affect. Behavior is normal.     Right knee: No gross deformity.  Area of pain over the anterior and medial knee.  Able to squat down about snf.  Antalgic gait.     MRI right knee: \"Patella bethanie otherwise essentially unremarkable exam.\"    Assessment/Plan:       1. Knee strain, right, subsequent encounter  - Referral to Orthopedics    Released to Restricted Duty FROM 7/8/2022 TO    Limit driving to 2 hrs per session. Until cleared by orthopedics    Placed referral for orthopedics  Finish any remaining physical therapy visits  OTC ibuprofen as needed  Gradually increase walking and squatting as " tolerated  Restricted duty  Follow-up as needed      Differential diagnosis, natural history, supportive care, and indications for immediate follow-up discussed.    Approximately 25 minutes was spent in preparing for visit, obtaining history, exam and evaluation, patient counseling/education and post visit documentation/orders.

## 2022-08-11 ENCOUNTER — OCCUPATIONAL MEDICINE (OUTPATIENT)
Dept: OCCUPATIONAL MEDICINE | Facility: CLINIC | Age: 27
End: 2022-08-11
Payer: COMMERCIAL

## 2022-08-11 VITALS
RESPIRATION RATE: 18 BRPM | DIASTOLIC BLOOD PRESSURE: 70 MMHG | HEIGHT: 69 IN | BODY MASS INDEX: 28.88 KG/M2 | HEART RATE: 80 BPM | WEIGHT: 195 LBS | SYSTOLIC BLOOD PRESSURE: 116 MMHG

## 2022-08-11 DIAGNOSIS — S86.911D KNEE STRAIN, RIGHT, SUBSEQUENT ENCOUNTER: ICD-10-CM

## 2022-08-11 PROCEDURE — 99213 OFFICE O/P EST LOW 20 MIN: CPT | Performed by: PREVENTIVE MEDICINE

## 2022-08-11 NOTE — LETTER
"[address],   [address] [place] [address]  Phone:  [phone] - Fax:  [phone]   Occupational Health [facility] Progress Report and Disability Certification  Date of Service: 8/11/2022   No Show:  No  Date / Time of Next Visit: 9/8/2022 @430pm   Claim Information   Patient Name: Nick Brewer  Claim Number:     Employer: [employer]  Date of Injury: 4/26/2022     Insurer / TPA: [employer]  ID / SSN:     Occupation: Maintenance  Diagnosis: The encounter diagnosis was Knee strain, right, subsequent encounter.    Medical Information   Related to Industrial Injury? 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  DELMER: Slipped slipped and fell on wet grass.  Patient states that overall symptoms are about the same.  Patient states that overall symptoms are about the same.  However he has been doing a lot of walking around his apartment including going up and down hills and is up to being able to walk for about an hour and a half before becomes too painful.  He did see orthopedics who recommended patellar stabilizing brace, more physical therapy and referral back to occupational health.  He states he ran out of physical therapy visits and has not done any in over 2 weeks.   Objective Findings: Right knee: No gross deformity.  Area of pain over the anterior and medial knee.  Normal gait.     MRI right knee: \"Patella bethanie otherwise essentially unremarkable exam.\"      Pre-Existing Condition(s):     Assessment:   Condition Same    Status: Additional Care Required  Permanent Disability:No    Plan:      Diagnostics:      Comments:  Referral for more physical therapy visits.  Continue knee brace as needed  Continue to walk around neighborhood increasing duration as tolerated  OTC ibuprofen as needed  Restricted duty  Follow-up 4 weeks    Disability Information   Status: Released to " Restricted Duty    From:  8/11/2022  Through: 9/8/2022 Restrictions are: Temporary   Physical Restrictions   Sitting:    Standing:  < or = to 4 hrs/day Stooping:    Bending:      Squatting:  < or = to 2 hrs/day Walking:  < or = to 4 hrs/day Climbing:    Pushing:      Pulling:    Other:    Reaching Above Shoulder (L):   Reaching Above Shoulder (R):       Reaching Below Shoulder (L):    Reaching Below Shoulder (R):      Not to exceed Weight Limits   Carrying(hrs):   Weight Limit(lb):   Lifting(hrs):   Weight  Limit(lb):     Comments:      Repetitive Actions   Hands: i.e. Fine Manipulations from Grasping:     Feet: i.e. Operating Foot Controls:     Driving / Operate Machinery:     Health Care Provider’s Original or Electronic Signature  Tonio Rendon D.O. Health Care Provider’s Original or Electronic Signature    Khurram Posada MD         Clinic Name / Location: [address] [address] Clinic Phone Number: Dept: [phone]   Appointment Time: 4:45 Pm Visit Start Time: 4:37 PM   Check-In Time:  4:35 Pm Visit Discharge Time:  455pm   Original-Treating Physician or Chiropractor    Page 2-Insurer/TPA    Page 3-Employer    Page 4-Employee

## 2022-08-11 NOTE — PROGRESS NOTES
"Subjective:     Nick Brewer is a 27 y.o. 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  DELMER: Slipped slipped and fell on wet grass.  Patient states that overall symptoms are about the same.  Patient states that overall symptoms are about the same.  However he has been doing a lot of walking around his apartment including going up and down hills and is up to being able to walk for about an hour and a half before becomes too painful.  He did see orthopedics who recommended patellar stabilizing brace, more physical therapy and referral back to occupational health.  He states he ran out of physical therapy visits and has not done any in over 2 weeks.    ROS         Objective:     /70   Pulse 80   Resp 18   Ht 1.753 m (5' 9\")   Wt 88.5 kg (195 lb)   BMI 28.80 kg/m²     Constitutional: Patient is in no acute distress. Appears well-developed and well-nourished.   Cardiovascular: Normal rate.    Pulmonary/Chest: Effort normal. No respiratory distress.   Neurological: Patient is alert and oriented to person, place, and time.   Skin: Skin is warm and dry.   Psychiatric: Normal mood and affect. Behavior is normal.     Right knee: No gross deformity.  Area of pain over the anterior and medial knee.  Normal gait.     MRI right knee: \"Patella bethanie otherwise essentially unremarkable exam.\"       Assessment/Plan:       1. Knee strain, right, subsequent encounter  - Referral to Physical Therapy    Released to Restricted Duty FROM 8/11/2022 TO 9/8/2022       Referral for more physical therapy visits.  Continue knee brace as needed  Continue to walk around neighborhood increasing duration as tolerated  OTC ibuprofen as needed  Restricted duty  Follow-up 4 weeks    Differential diagnosis, natural history, supportive care, and indications for immediate follow-up discussed.    Approximately 25 minutes was spent in " preparing for visit, obtaining history, exam and evaluation, patient counseling/education and post visit documentation/orders.

## 2022-09-08 ENCOUNTER — OCCUPATIONAL MEDICINE (OUTPATIENT)
Dept: OCCUPATIONAL MEDICINE | Facility: CLINIC | Age: 27
End: 2022-09-08
Payer: COMMERCIAL

## 2022-09-08 VITALS
HEART RATE: 86 BPM | SYSTOLIC BLOOD PRESSURE: 126 MMHG | RESPIRATION RATE: 18 BRPM | BODY MASS INDEX: 29.03 KG/M2 | HEIGHT: 69 IN | WEIGHT: 196 LBS | DIASTOLIC BLOOD PRESSURE: 72 MMHG

## 2022-09-08 DIAGNOSIS — S86.911D KNEE STRAIN, RIGHT, SUBSEQUENT ENCOUNTER: ICD-10-CM

## 2022-09-08 PROCEDURE — 99213 OFFICE O/P EST LOW 20 MIN: CPT | Performed by: PREVENTIVE MEDICINE

## 2022-09-08 NOTE — LETTER
"[address],   [address] [place] [address]  Phone:  [phone] - Fax:  [phone]   [facility] Progress [facility] and Disability Certification  Date of Service: 9/8/2022   No Show:  No  Date / Time of Next Visit: 10/6/2022 @4:30pm   Claim Information   Patient Name: Nick Brewer  Claim Number:     Employer: [employer]  Date of Injury: 4/26/2022     Insurer / TPA: [employer]  ID / SSN:     Occupation: Maintenance  Diagnosis: The encounter diagnosis was Knee strain, right, subsequent encounter.    Medical Information   Related to Industrial Injury? 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  DELMER: Slipped slipped and fell on wet grass.  Patient states that overall symptoms are little improved.  He states he has little bit less popping in the knee he states the pain is changed from more of an acute pain to more of a \"muscle sore\" type pain.  There is delay in the physical therapy referral which underwent utilization review.  Patient states he just started physical therapy yesterday.  He was approved for 12 visits.  He states that his workplace is still not called him in for light duty.     Objective Findings: Right knee: No gross deformity.  Area of pain over the anterior and medial knee.  Normal gait.     MRI right knee: \"Patella bethanie otherwise essentially unremarkable exam.\"   Pre-Existing Condition(s):     Assessment:   Condition Same    Status: Additional Care Required  Permanent Disability:No    Plan:      Diagnostics:      Comments:  Continue physical therapy  Continue knee brace as needed  Continue to walk around neighborhood increasing duration as tolerated  OTC ibuprofen as needed  Restricted duty  Follow-up 4 weeks, advised patient that claim will likely be MMI at the end of physical therapy    Disability Information   Status: Released to " Restricted Duty    From:  9/8/2022  Through: 10/6/2022 Restrictions are: Temporary   Physical Restrictions   Sitting:    Standing:  < or = to 4 hrs/day Stooping:    Bending:      Squatting:  < or = to 4 hrs/day Walking:  < or = to 4 hrs/day Climbing:    Pushing:      Pulling:    Other:    Reaching Above Shoulder (L):   Reaching Above Shoulder (R):       Reaching Below Shoulder (L):    Reaching Below Shoulder (R):      Not to exceed Weight Limits   Carrying(hrs):   Weight Limit(lb):   Lifting(hrs):   Weight  Limit(lb):     Comments:      Repetitive Actions   Hands: i.e. Fine Manipulations from Grasping:     Feet: i.e. Operating Foot Controls:     Driving / Operate Machinery:     Health Care Provider’s Original or Electronic Signature  Tonio Rendon D.O. Health Care Provider’s Original or Electronic Signature    Khurram Posada MD         Clinic Name / Location: [address] [address] Clinic Phone Number: Dept: [phone]   Appointment Time: 4:30 Pm Visit Start Time: 4:35 PM   Check-In Time:  4:32 Pm Visit Discharge Time:  457pm   Original-Treating Physician or Chiropractor    Page 2-Insurer/TPA    Page 3-Employer    Page 4-Employee

## 2022-09-08 NOTE — PROGRESS NOTES
"Subjective:     Nick Brewer is a 27 y.o. 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  DELMER: Slipped slipped and fell on wet grass.  Patient states that overall symptoms are little improved.  He states he has little bit less popping in the knee he states the pain is changed from more of an acute pain to more of a \"muscle sore\" type pain.  There is delay in the physical therapy referral which underwent utilization review.  Patient states he just started physical therapy yesterday.  He was approved for 12 visits.  He states that his workplace is still not called him in for light duty.      ROS         Objective:     /72   Pulse 86   Resp 18   Ht 1.753 m (5' 9\")   Wt 88.9 kg (196 lb)   BMI 28.94 kg/m²     Constitutional: Patient is in no acute distress. Appears well-developed and well-nourished.   Cardiovascular: Normal rate.    Pulmonary/Chest: Effort normal. No respiratory distress.   Neurological: Patient is alert and oriented to person, place, and time.   Skin: Skin is warm and dry.   Psychiatric: Normal mood and affect. Behavior is normal.     Right knee: No gross deformity.  Area of pain over the anterior and medial knee.  Normal gait.     MRI right knee: \"Patella bethanie otherwise essentially unremarkable exam.\"    Assessment/Plan:       1. Knee strain, right, subsequent encounter    Released to Restricted Duty FROM 9/8/2022 TO 10/6/2022       Continue physical therapy  Continue knee brace as needed  Continue to walk around neighborhood increasing duration as tolerated  OTC ibuprofen as needed  Restricted duty  Follow-up 4 weeks, advised patient that claim will likely be MMI at the end of physical therapy    Differential diagnosis, natural history, supportive care, and indications for immediate follow-up discussed.    Approximately 25 minutes was spent in preparing for visit, obtaining " history, exam and evaluation, patient counseling/education and post visit documentation/orders.

## 2022-10-11 ENCOUNTER — OCCUPATIONAL MEDICINE (OUTPATIENT)
Dept: OCCUPATIONAL MEDICINE | Facility: CLINIC | Age: 27
End: 2022-10-11
Payer: COMMERCIAL

## 2022-10-11 VITALS
OXYGEN SATURATION: 97 % | HEART RATE: 77 BPM | TEMPERATURE: 97.6 F | HEIGHT: 69 IN | DIASTOLIC BLOOD PRESSURE: 56 MMHG | WEIGHT: 188 LBS | SYSTOLIC BLOOD PRESSURE: 118 MMHG | RESPIRATION RATE: 16 BRPM | BODY MASS INDEX: 27.85 KG/M2

## 2022-10-11 DIAGNOSIS — S86.911D KNEE STRAIN, RIGHT, SUBSEQUENT ENCOUNTER: ICD-10-CM

## 2022-10-11 PROCEDURE — 99212 OFFICE O/P EST SF 10 MIN: CPT | Performed by: PREVENTIVE MEDICINE

## 2022-10-11 NOTE — LETTER
[address],   [address] [place] [address]  Phone:  [phone] - Fax:  [phone]   Occupational Health [facility] Progress Report and Disability Certification  Date of Service: 10/11/2022   No Show:  No  Date / Time of Next Visit:  Release from care   Claim Information   Patient Name: Nick Brewer  Claim Number:     Employer: [employer]  Date of Injury: 4/26/2022     Insurer / TPA: [employer]  ID / SSN:     Occupation: Maintenance  Diagnosis: The encounter diagnosis was Knee strain, right, subsequent encounter.    Medical Information   Related to Industrial Injury? 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  DELMER: Slipped slipped and fell on wet grass.  Patient reports good improvement in symptoms.  He states with normal activity such as walking, squatting and everyday life he is comfortable doing all that without difficulty.  He does still get some pain with running, but the physical therapist told him to do a strength training program that would help resolve symptoms.  Feels comfortable with release at this time.   Objective Findings: Right knee: No gross deformity.  Full range of motion.  Normal gait.   Pre-Existing Condition(s):     Assessment:   Condition Improved    Status: Discharged /  MMI  Permanent Disability:No    Plan:      Diagnostics:      Comments:  Released from care, claim at MMI and non-ratable  Full duty  Follow-up as needed  Follow-up 4 weeks, advised patient that claim will likely be MMI at the end of physical therapy    Disability Information   Status: Released to Full Duty    From:  10/11/2022  Through:   Restrictions are:     Physical Restrictions   Sitting:    Standing:    Stooping:    Bending:      Squatting:    Walking:    Climbing:    Pushing:      Pulling:    Other:    Reaching Above Shoulder (L):   Reaching Above Shoulder (R):        Reaching Below Shoulder (L):    Reaching Below Shoulder (R):      Not to exceed Weight Limits   Carrying(hrs):   Weight Limit(lb):   Lifting(hrs):   Weight  Limit(lb):     Comments:      Repetitive Actions   Hands: i.e. Fine Manipulations from Grasping:     Feet: i.e. Operating Foot Controls:     Driving / Operate Machinery:     Health Care Provider’s Original or Electronic Signature  Tonio Rendon D.O. Health Care Provider’s Original or Electronic Signature    Khurram Posada MD         Clinic Name / Location: [address] [address] Clinic Phone Number: Dept: [phone]   Appointment Time: 10:30 Am Visit Start Time: 10:11 AM   Check-In Time:  10:05 Am Visit Discharge Time:  10:28AM   Original-Treating Physician or Chiropractor    Page 2-Insurer/TPA    Page 3-Employer    Page 4-Employee

## 2022-10-11 NOTE — PROGRESS NOTES
"Subjective:     Nick Brewer is a 27 y.o. male who presents for Follow-Up ([facility] WC DOI 4/26/2022: RIGHT KNEE FEELING BETTER RM 16)      DOI 4/26/2022: 26-year-old injured worker presents with right knee injury.  DELMER: Slipped slipped and fell on wet grass.  Patient reports good improvement in symptoms.  He states with normal activity such as walking, squatting and everyday life he is comfortable doing all that without difficulty.  He does still get some pain with running, but the physical therapist told him to do a strength training program that would help resolve symptoms.  Feels comfortable with release at this time.    ROS         Objective:     /56 (BP Location: Right arm, Patient Position: Sitting, BP Cuff Size: Large adult)   Pulse 77   Temp 36.4 °C (97.6 °F) (Temporal)   Resp 16   Ht 1.753 m (5' 9\")   Wt 85.3 kg (188 lb)   SpO2 97%   BMI 27.76 kg/m²     Constitutional: Patient is in no acute distress. Appears well-developed and well-nourished.   Cardiovascular: Normal rate.    Pulmonary/Chest: Effort normal. No respiratory distress.   Neurological: Patient is alert and oriented to person, place, and time.   Skin: Skin is warm and dry.   Psychiatric: Normal mood and affect. Behavior is normal.     Right knee: No gross deformity.  Full range of motion.  Normal gait.    Assessment/Plan:       1. Knee strain, right, subsequent encounter    Released to Full Duty FROM 10/11/2022 TO       Released from care, claim at MMI and non-ratable  Full duty  Follow-up as needed  Follow-up 4 weeks, advised patient that claim will likely be MMI at the end of physical therapy    Differential diagnosis, natural history, supportive care, and indications for immediate follow-up discussed.    Approximately 15 minutes was spent in preparing for visit, obtaining history, exam and evaluation, patient counseling/education and post visit documentation/orders.    "